# Patient Record
Sex: FEMALE | Race: ASIAN | NOT HISPANIC OR LATINO
[De-identification: names, ages, dates, MRNs, and addresses within clinical notes are randomized per-mention and may not be internally consistent; named-entity substitution may affect disease eponyms.]

---

## 2018-12-18 ENCOUNTER — APPOINTMENT (OUTPATIENT)
Dept: OPHTHALMOLOGY | Facility: CLINIC | Age: 63
End: 2018-12-18
Payer: MEDICARE

## 2018-12-18 DIAGNOSIS — H11.153 PINGUECULA, BILATERAL: ICD-10-CM

## 2018-12-18 DIAGNOSIS — H25.812 COMBINED FORMS OF AGE-RELATED CATARACT, LEFT EYE: ICD-10-CM

## 2018-12-18 DIAGNOSIS — Z85.840 PERSONAL HISTORY OF MALIGNANT NEOPLASM OF EYE: ICD-10-CM

## 2018-12-18 PROCEDURE — 92004 COMPRE OPH EXAM NEW PT 1/>: CPT

## 2018-12-18 PROCEDURE — 92136 OPHTHALMIC BIOMETRY: CPT

## 2018-12-18 PROCEDURE — 92499 UNLISTED OPH SVC/PROCEDURE: CPT

## 2019-01-10 ENCOUNTER — MEDICATION RENEWAL (OUTPATIENT)
Age: 64
End: 2019-01-10

## 2019-01-10 RX ORDER — PREDNISOLONE ACETATE 10 MG/ML
1 SUSPENSION/ DROPS OPHTHALMIC 4 TIMES DAILY
Qty: 1 | Refills: 1 | Status: ACTIVE | COMMUNITY
Start: 2019-01-10 | End: 1900-01-01

## 2019-01-10 RX ORDER — NEPAFENAC 3 MG/ML
0.3 SUSPENSION/ DROPS OPHTHALMIC DAILY
Qty: 1 | Refills: 1 | Status: ACTIVE | COMMUNITY
Start: 2019-01-10 | End: 1900-01-01

## 2019-01-10 RX ORDER — MOXIFLOXACIN OPHTHALMIC 5 MG/ML
0.5 SOLUTION/ DROPS OPHTHALMIC 4 TIMES DAILY
Qty: 1 | Refills: 3 | Status: ACTIVE | COMMUNITY
Start: 2019-01-10 | End: 1900-01-01

## 2019-01-14 ENCOUNTER — APPOINTMENT (OUTPATIENT)
Dept: OPHTHALMOLOGY | Facility: AMBULATORY SURGERY CENTER | Age: 64
End: 2019-01-14
Payer: MEDICARE

## 2019-01-14 ENCOUNTER — OUTPATIENT (OUTPATIENT)
Dept: OUTPATIENT SERVICES | Facility: HOSPITAL | Age: 64
LOS: 1 days | Discharge: HOME | End: 2019-01-14

## 2019-01-14 PROCEDURE — 66984 XCAPSL CTRC RMVL W/O ECP: CPT | Mod: RT

## 2019-01-15 ENCOUNTER — APPOINTMENT (OUTPATIENT)
Dept: OPHTHALMOLOGY | Facility: CLINIC | Age: 64
End: 2019-01-15
Payer: MEDICARE

## 2019-01-15 PROCEDURE — 99024 POSTOP FOLLOW-UP VISIT: CPT

## 2019-01-22 ENCOUNTER — APPOINTMENT (OUTPATIENT)
Dept: OPHTHALMOLOGY | Facility: CLINIC | Age: 64
End: 2019-01-22
Payer: MEDICARE

## 2019-01-22 PROCEDURE — 99024 POSTOP FOLLOW-UP VISIT: CPT

## 2019-02-05 ENCOUNTER — APPOINTMENT (OUTPATIENT)
Dept: OPHTHALMOLOGY | Facility: CLINIC | Age: 64
End: 2019-02-05
Payer: MEDICARE

## 2019-02-05 DIAGNOSIS — H25.811 COMBINED FORMS OF AGE-RELATED CATARACT, RIGHT EYE: ICD-10-CM

## 2019-02-05 PROCEDURE — 99024 POSTOP FOLLOW-UP VISIT: CPT

## 2019-08-01 ENCOUNTER — EMERGENCY (EMERGENCY)
Facility: HOSPITAL | Age: 64
LOS: 1 days | Discharge: HOME | End: 2019-08-01
Attending: EMERGENCY MEDICINE | Admitting: EMERGENCY MEDICINE
Payer: MEDICARE

## 2019-08-01 VITALS
SYSTOLIC BLOOD PRESSURE: 131 MMHG | HEART RATE: 65 BPM | TEMPERATURE: 98 F | OXYGEN SATURATION: 98 % | RESPIRATION RATE: 18 BRPM | HEIGHT: 62 IN | WEIGHT: 100.09 LBS | DIASTOLIC BLOOD PRESSURE: 73 MMHG

## 2019-08-01 VITALS
TEMPERATURE: 98 F | RESPIRATION RATE: 16 BRPM | SYSTOLIC BLOOD PRESSURE: 130 MMHG | OXYGEN SATURATION: 99 % | DIASTOLIC BLOOD PRESSURE: 69 MMHG | HEART RATE: 61 BPM

## 2019-08-01 LAB
ALBUMIN SERPL ELPH-MCNC: 4.1 G/DL — SIGNIFICANT CHANGE UP (ref 3.3–5)
ALP SERPL-CCNC: 59 U/L — SIGNIFICANT CHANGE UP (ref 40–120)
ALT FLD-CCNC: 21 U/L — SIGNIFICANT CHANGE UP (ref 10–45)
ANION GAP SERPL CALC-SCNC: 10 MMOL/L — SIGNIFICANT CHANGE UP (ref 5–17)
AST SERPL-CCNC: 27 U/L — SIGNIFICANT CHANGE UP (ref 10–40)
BASOPHILS # BLD AUTO: 0.02 K/UL — SIGNIFICANT CHANGE UP (ref 0–0.2)
BASOPHILS NFR BLD AUTO: 0.5 % — SIGNIFICANT CHANGE UP (ref 0–2)
BILIRUB SERPL-MCNC: 0.4 MG/DL — SIGNIFICANT CHANGE UP (ref 0.2–1.2)
BUN SERPL-MCNC: 13 MG/DL — SIGNIFICANT CHANGE UP (ref 7–23)
CALCIUM SERPL-MCNC: 8.9 MG/DL — SIGNIFICANT CHANGE UP (ref 8.4–10.5)
CHLORIDE SERPL-SCNC: 106 MMOL/L — SIGNIFICANT CHANGE UP (ref 96–108)
CO2 SERPL-SCNC: 25 MMOL/L — SIGNIFICANT CHANGE UP (ref 22–31)
CREAT SERPL-MCNC: 0.52 MG/DL — SIGNIFICANT CHANGE UP (ref 0.5–1.3)
EOSINOPHIL # BLD AUTO: 0.09 K/UL — SIGNIFICANT CHANGE UP (ref 0–0.5)
EOSINOPHIL NFR BLD AUTO: 2.4 % — SIGNIFICANT CHANGE UP (ref 0–6)
EXTRA BLUE TOP TUBE: SIGNIFICANT CHANGE UP
EXTRA SST TUBE: SIGNIFICANT CHANGE UP
GLUCOSE SERPL-MCNC: 163 MG/DL — HIGH (ref 70–99)
HCT VFR BLD CALC: 40.9 % — SIGNIFICANT CHANGE UP (ref 34.5–45)
HGB BLD-MCNC: 13.2 G/DL — SIGNIFICANT CHANGE UP (ref 11.5–15.5)
IMM GRANULOCYTES NFR BLD AUTO: 1.3 % — SIGNIFICANT CHANGE UP (ref 0–1.5)
LYMPHOCYTES # BLD AUTO: 1.19 K/UL — SIGNIFICANT CHANGE UP (ref 1–3.3)
LYMPHOCYTES # BLD AUTO: 32.1 % — SIGNIFICANT CHANGE UP (ref 13–44)
MCHC RBC-ENTMCNC: 32.3 GM/DL — SIGNIFICANT CHANGE UP (ref 32–36)
MCHC RBC-ENTMCNC: 32.8 PG — SIGNIFICANT CHANGE UP (ref 27–34)
MCV RBC AUTO: 101.5 FL — HIGH (ref 80–100)
MONOCYTES # BLD AUTO: 0.28 K/UL — SIGNIFICANT CHANGE UP (ref 0–0.9)
MONOCYTES NFR BLD AUTO: 7.5 % — SIGNIFICANT CHANGE UP (ref 2–14)
NEUTROPHILS # BLD AUTO: 2.08 K/UL — SIGNIFICANT CHANGE UP (ref 1.8–7.4)
NEUTROPHILS NFR BLD AUTO: 56.2 % — SIGNIFICANT CHANGE UP (ref 43–77)
NRBC # BLD: 0 /100 WBCS — SIGNIFICANT CHANGE UP (ref 0–0)
PLATELET # BLD AUTO: 176 K/UL — SIGNIFICANT CHANGE UP (ref 150–400)
POTASSIUM SERPL-MCNC: 4 MMOL/L — SIGNIFICANT CHANGE UP (ref 3.5–5.3)
POTASSIUM SERPL-SCNC: 4 MMOL/L — SIGNIFICANT CHANGE UP (ref 3.5–5.3)
PROT SERPL-MCNC: 6.3 G/DL — SIGNIFICANT CHANGE UP (ref 6–8.3)
RBC # BLD: 4.03 M/UL — SIGNIFICANT CHANGE UP (ref 3.8–5.2)
RBC # FLD: 13 % — SIGNIFICANT CHANGE UP (ref 10.3–14.5)
SODIUM SERPL-SCNC: 141 MMOL/L — SIGNIFICANT CHANGE UP (ref 135–145)
TROPONIN T SERPL-MCNC: <0.01 NG/ML — SIGNIFICANT CHANGE UP (ref 0–0.01)
WBC # BLD: 3.71 K/UL — LOW (ref 3.8–10.5)
WBC # FLD AUTO: 3.71 K/UL — LOW (ref 3.8–10.5)

## 2019-08-01 PROCEDURE — 70498 CT ANGIOGRAPHY NECK: CPT

## 2019-08-01 PROCEDURE — 82962 GLUCOSE BLOOD TEST: CPT

## 2019-08-01 PROCEDURE — 70450 CT HEAD/BRAIN W/O DYE: CPT | Mod: 26,59

## 2019-08-01 PROCEDURE — 70496 CT ANGIOGRAPHY HEAD: CPT

## 2019-08-01 PROCEDURE — 93010 ELECTROCARDIOGRAM REPORT: CPT

## 2019-08-01 PROCEDURE — 93005 ELECTROCARDIOGRAM TRACING: CPT

## 2019-08-01 PROCEDURE — 70498 CT ANGIOGRAPHY NECK: CPT | Mod: 26

## 2019-08-01 PROCEDURE — 0042T: CPT

## 2019-08-01 PROCEDURE — 70450 CT HEAD/BRAIN W/O DYE: CPT

## 2019-08-01 PROCEDURE — 71045 X-RAY EXAM CHEST 1 VIEW: CPT | Mod: 26

## 2019-08-01 PROCEDURE — 80053 COMPREHEN METABOLIC PANEL: CPT

## 2019-08-01 PROCEDURE — 99285 EMERGENCY DEPT VISIT HI MDM: CPT

## 2019-08-01 PROCEDURE — 84484 ASSAY OF TROPONIN QUANT: CPT

## 2019-08-01 PROCEDURE — 99285 EMERGENCY DEPT VISIT HI MDM: CPT | Mod: 25

## 2019-08-01 PROCEDURE — 85025 COMPLETE CBC W/AUTO DIFF WBC: CPT

## 2019-08-01 PROCEDURE — 99283 EMERGENCY DEPT VISIT LOW MDM: CPT

## 2019-08-01 PROCEDURE — 71045 X-RAY EXAM CHEST 1 VIEW: CPT

## 2019-08-01 PROCEDURE — 70496 CT ANGIOGRAPHY HEAD: CPT | Mod: 26

## 2019-08-01 RX ORDER — MECLIZINE HCL 12.5 MG
25 TABLET ORAL ONCE
Refills: 0 | Status: DISCONTINUED | OUTPATIENT
Start: 2019-08-01 | End: 2019-08-01

## 2019-08-01 RX ORDER — SODIUM CHLORIDE 9 MG/ML
1000 INJECTION INTRAMUSCULAR; INTRAVENOUS; SUBCUTANEOUS ONCE
Refills: 0 | Status: COMPLETED | OUTPATIENT
Start: 2019-08-01 | End: 2019-08-01

## 2019-08-01 RX ADMIN — SODIUM CHLORIDE 1000 MILLILITER(S): 9 INJECTION INTRAMUSCULAR; INTRAVENOUS; SUBCUTANEOUS at 08:48

## 2019-08-01 NOTE — ED ADULT NURSE NOTE - FINAL NURSING ELECTRONIC SIGNATURE
I have ordered CT abd with and without contrast with liver protocol on this patient. Please arrange.     Also, please call PCP and determine if patient called their office regarding dizziness in the office today. She will need to be see as soon as possible.    Thanks.   01-Aug-2019 10:40

## 2019-08-01 NOTE — ED PROVIDER NOTE - OBJECTIVE STATEMENT
63 yo with ho of vertigo in past, meningioma , feeling vague vertigo sensation mostly at night for last 2 days, awoke this morning at 6 am feeling well however around 630 again felt vertiginous, felt gait instability and new sensation of numbness and weakness on right side of body and right face. no falls, no headache, no neck pain. no visual changes.

## 2019-08-01 NOTE — ED PROVIDER NOTE - PROGRESS NOTE DETAILS
CT reassuring, plan to admit for workup for TIA vs possible seizure, plans for EEG and MRI in pt . pt now neuro intact.

## 2019-08-01 NOTE — ED ADULT NURSE NOTE - NSIMPLEMENTINTERV_GEN_ALL_ED
Implemented All Fall Risk Interventions:  [place] to call system. Call bell, personal items and telephone within reach. Instruct patient to call for assistance. Room bathroom lighting operational. Non-slip footwear when patient is off stretcher. Physically safe environment: no spills, clutter or unnecessary equipment. Stretcher in lowest position, wheels locked, appropriate side rails in place. Provide visual cue, wrist band, yellow gown, etc. Monitor gait and stability. Monitor for mental status changes and reorient to person, place, and time. Review medications for side effects contributing to fall risk. Reinforce activity limits and safety measures with patient and family.

## 2019-08-01 NOTE — ED PROVIDER NOTE - CLINICAL SUMMARY MEDICAL DECISION MAKING FREE TEXT BOX
65 yo with ho meningioma, vertigo , now with subjective ataxia and numbness athough zero stroke scale. stroke code called for evaluation for CVA, stroke team evaluating pt .

## 2019-08-01 NOTE — ED ADULT TRIAGE NOTE - CHIEF COMPLAINT QUOTE
pt. c/o headache, unbalanced and dizziness since Tuesday night, reports hx of vertigo, pt. woke up today at 6am with weakness to Rt side of body and headache.

## 2019-08-01 NOTE — ED PROVIDER NOTE - CARE PLAN
Principal Discharge DX:	Transient ischemia Principal Discharge DX:	Vertigo  Secondary Diagnosis:	Transient ischemia

## 2019-08-01 NOTE — ED PROVIDER NOTE - NEUROLOGICAL, MLM
Alert and oriented, no focal deficits, no motor or sensory deficits. CN'II-XII intact, no pronator drift, nl finger to nose, clear speech, gait intact  ( pt reports feeling difference on rt side but equal to light touch and pt also reports feeling gait instability however appears normal )

## 2019-08-01 NOTE — ED ADULT NURSE NOTE - OBJECTIVE STATEMENT
Received a 64 female with a chief complaint of weakness isolated to the right upper extremity onset 0600 upon awakening accompanied with numbness that has been resolved. Patient with a PMH of Vertigo, Chronic Right Orbital Meningioma. Patient reports that she had an episode of dizziness and weakness on Tuesday which she attested to her vertigo. Patient is able to stand and ambulate independently. Patient in no acute distress. No facial droop, no slurring of speech or aphasia. GCS 15. Patient awake, alert, and oriented. Verbally responsive. Sensation present to all extremities. Patient reports that she has undergone a series of brain MRIs on a yearly basis to evaluate for the meningioma. Last reported radiograph reported to be MAR 2019.

## 2019-08-01 NOTE — CONSULT NOTE ADULT - ASSESSMENT
64y Female with PMHx of vertigo and meningioma (s/p orbital craniotomy resection in 98) presents with 2 hours of right sided facial, upper and lower extremity numbness and tingling. CTH/CTA/CTN with no signs of infarct, hemorrage, or large vessel occlusion. No changes in meningioma. Neuro exam benign with no sensory changes.          Dispo: Home  Discussed with Dr. Ari Wagoner, Neuro consults 64y Female with PMHx of vertigo and meningioma (s/p orbital craniotomy resection in 98) presents with 2 hours of right sided facial, upper and lower extremity numbness and tingling. CTH/CTA/CTN with no signs of infarct, hemorrhage or large vessel occlusion. No changes in meningioma. Neuro exam benign with no sensory changes.    Recommendations:    - Patient refusing admission for further workup of symptoms at this time. Ok to be discharged from Neuro standpoint as long as patient aware that she needs to follow up with PCP which would include TTE with bubble, MRI and holter monitor  - start ASA 81mg daily       Dispo: Home  Discussed with Dr. Ari Wagoner, Neuro consults

## 2019-08-01 NOTE — ED PROVIDER NOTE - CARE PROVIDER_API CALL
Phillip Chapman)  Neurology  [address], [place] [facility]  Phone: [phone]  Fax: [phone]  Follow Up Time:

## 2019-08-01 NOTE — CONSULT NOTE ADULT - SUBJECTIVE AND OBJECTIVE BOX
**STROKE CODE CONSULT NOTE**    Last known well time/Time of onset of symptoms:    HPI: 64y Female with PMHx of vertigo and meningioma (s/p orbital craniotomy resection in 98) presents with 2 hours of right sided facial, upper and lower extremity numbness and tingling. For the past two days, she was feeling dizzy and attributed it to her known vertigo as it was similar in nature. This morning she awoke with the sensory changes which was new to her so she took 1 baby aspirin and came to the ED because it persisted. Of note, she describes similar symptoms in 05 which is when she was diagnosed with vertigo. Patient is uptodate on physical exam screening and recently followed up with her opthalmologist for her meningioma. She last got an MRI in January of 2018 which was stable. Stroke code was called in the ED and CT head was normal as well as CTA and CTN. She currently denies any symptoms including h/n/v/d, numbness/tingling, vision changes, cp, sob, palpitations, gait disturbances, abd pain and leg pain.     T(C): 36.8 (08-01-19 @ 10:38), Max: 36.8 (08-01-19 @ 08:00)  HR: 61 (08-01-19 @ 10:38) (59 - 66)  BP: 130/69 (08-01-19 @ 10:38) (122/68 - 131/73)  RR: 16 (08-01-19 @ 10:38) (16 - 20)  SpO2: 99% (08-01-19 @ 10:38) (98% - 99%)    PAST MEDICAL & SURGICAL HISTORY:  Vertigo  Meningioma    FAMILY HISTORY:  SOCIAL HISTORY:  Denies smoking, drinking, or drug use    ROS:   Constitutional: No fever, weight loss or fatigue  Eyes: No eye pain, visual disturbances, or discharge  ENMT:  No difficulty hearing, tinnitus, vertigo; No sinus or throat pain  Neck: No pain or stiffness  Respiratory: No cough, wheezing, chills or hemoptysis  Cardiovascular: No chest pain, palpitations, shortness of breath, dizziness or leg swelling  Gastrointestinal: No abdominal pain. No nausea, vomiting or hematemesis; No diarrhea or constipation. Nohematochezia.  Genitourinary: No dysuria, frequency, hematuria or incontinence  Neurological: As per HPI  Skin: No itching, burning, rashes or lesions   Endocrine: No heat or cold intolerance; No hair loss  Musculoskeletal: No joint pain or swelling; No muscle, back or extremity pain  Psychiatric: No depression, anxiety, mood swings or difficulty sleeping  Heme/Lymph: No easy bruising or bleeding gums    MEDICATIONS  (STANDING):    MEDICATIONS  (PRN):    Allergies    No Known Allergies    Intolerances    Vital Signs Last 24 Hrs  T(C): 36.8 (01 Aug 2019 10:38), Max: 36.8 (01 Aug 2019 08:00)  T(F): 98.3 (01 Aug 2019 10:38), Max: 98.3 (01 Aug 2019 10:38)  HR: 61 (01 Aug 2019 10:38) (59 - 66)  BP: 130/69 (01 Aug 2019 10:38) (122/68 - 131/73)  BP(mean): --  RR: 16 (01 Aug 2019 10:38) (16 - 20)  SpO2: 99% (01 Aug 2019 10:38) (98% - 99%)    Physical exam:  General: No acute distress, awake and alert  Cardiovascular: Regular rate and rhythm, no murmurs, rubs, or gallops. No bruits  Pulmonary: Anterior breath sounds clear bilaterally, no crackles or wheezing. No use of accessory muscles  Extremities: Radial and DP pulses +2, no edema    Neurologic:  -Mental status: Awake, alert, oriented to person, place, and time. Speech is fluent with intact naming, repetition, and comprehension, no dysarthria. Recent and remote memory intact. Follows commands. Attention/concentration intact. Fund of knowledge appropriate.  -Cranial nerves:   II: Visual fields are full to confrontation.  III, IV, VI: Extraocular movements are intact without nystagmus. Pupils equally round and reactive to light  V:  Facial sensation V1-V3 equal and intact   VII: Face is symmetric with normal eye closure and smile  VIII: Hearing is bilaterally intact to finger rub  IX, X: Uvula is midline and soft palate rises symmetrically  XI: Head turning and shoulder shrug are intact.  XII: Tongue protrudes midline  Motor: Normal bulk and tone. No pronator drift. Strength bilateral upper extremity 5/5, bilateral lower extremities 5/5.  Rapid alternating movements intact and symmetric  Sensation: Intact to light touch bilaterally. No neglect or extinction on double simultaneous testing.  Coordination: No dysmetria on finger-to-nose and heel-to-shin bilaterally  Reflexes: Downgoing toes bilaterally   Gait: Narrow gait and steady    NIHSS: 0    Fingerstick Blood Glucose: CAPILLARY BLOOD GLUCOSE  123 (01 Aug 2019 10:58)    POCT Blood Glucose.: 123 mg/dL (01 Aug 2019 08:00)    LABS:                        13.2   3.71  )-----------( 176      ( 01 Aug 2019 07:35 )             40.9     08-01    141  |  106  |  13  ----------------------------<  163<H>  4.0   |  25  |  0.52    Ca    8.9      01 Aug 2019 07:35    TPro  6.3  /  Alb  4.1  /  TBili  0.4  /  DBili  x   /  AST  27  /  ALT  21  /  AlkPhos  59  08-01      CARDIAC MARKERS ( 01 Aug 2019 07:35 )  x     / <0.01 ng/mL / x     / x     / x              RADIOLOGY & ADDITIONAL STUDIES:    HCT: normal     CTA: normal     CTP: normal     -----------------------------------------------------------------------------------------------------------------  IV-tPA (Y/N):  N                             [place] time:  Reason IV-tPA not given: Resolution of symptoms

## 2019-08-01 NOTE — ED PROVIDER NOTE - NSFOLLOWUPINSTRUCTIONS_ED_ALL_ED_FT
Take baby aspirin once daily  follow up with cardiology and neurology  You should talk to them about MR, EEG and Holter monitoring for further workup.       Transient Ischemic Attack    WHAT YOU NEED TO KNOW:    What is a transient ischemic attack (TIA)? A TIA, or mini-stroke, happens when blood cannot flow to part of the brain. A TIA only lasts minutes to hours and does not cause lasting damage. It is still important to get immediate medical care. A TIA may be a warning that you are about to have an ischemic stroke. An ischemic stroke happens when blood flow to the brain is suddenly blocked, usually by a blood clot.Ischemic Stroke         What are the warning signs of a stroke? The word F.A.S.T. can help you remember and recognize warning signs of a stroke.     F = Face: One side of the face droops.      A = Arms: One arm starts to drop when both arms are raised.      S = Speech: Speech is slurred or sounds different than usual.      T = Time: A person who is having a stroke needs to be seen immediately. A stroke is a medical emergency that needs immediate treatment. Some medicines and treatments work best if given within a few hours of a stroke.FAST Sings of a Stroke         What are the signs and symptoms of a TIA? Any of the following may happen suddenly and be gone quickly:     Numb or weak areas of your face, arm, or leg, or paralysis on one side of your body      Trouble walking or keeping your balance      Dizziness or a severe headache      Slurred speech, trouble talking, or not understanding language      Blurry or double vision, or sudden blindness in one or both eyes    What increases my risk for a TIA?     Being male or 55 years of age or older      Use of birth control pills or hormone replacement medicine (in women)      A family history of stroke, or a low birthweight      High blood pressure, blood vessel disease, or sickle cell anemia that is not being treated      Atrial fibrillation, diabetes, or another heart condition    How is a TIA diagnosed? Your healthcare provider will ask you about the TIA. He or she may want to talk to anyone who witnessed the TIA or found you after it. You or the person should describe your signs and symptoms and when they started. Tell your provider about any medical conditions you have. You may also need any of the following:    Blood tests may be used to check your overall condition and how well your blood clots. The tests may also check for a high or low glucose (sugar) level. This can sometimes cause effects that are like a stroke or TIA.      A carotid ultrasound shows the blood flow in your carotid arteries. The carotid arteries are blood vessels in your neck that carry blood to your brain. A carotid ultrasound checks for narrow or blocked carotid arteries.      CT or MRI pictures may show blood flow blockage in your brain. You may be given contrast liquid to help the pictures show up better. Tell the healthcare provider if you have ever had an allergic reaction to contrast liquid. Do not enter the MRI room with anything metal. Metal can cause serious injury. Tell the healthcare provider if you have any metal in or on your body.    How is a TIA treated? A TIA does not need to be treated. The following may be used to treat the cause of your TIA to prevent a stroke:     Medicines may be used to prevent blood clots from forming. Other medicines may be needed to treat diabetes, depression, high cholesterol, or blood pressure problems. You may also need medicine to decrease the pressure in your brain, reduce pain, or prevent seizures.      Surgery may be needed to open a blocked artery (blood vessel). Blocked carotid arteries cause poor blood flow to the brain or heart.    What can I do to prevent another TIA or a stroke?     Manage health conditions. A condition such as diabetes can increase your risk for a stroke. Control your blood sugar level if you have hyperglycemia or diabetes. Take your prescribed medicines and check your blood sugar level as directed.How to check your blood sugar           Check your blood pressure as directed. High blood pressure can increase your risk for a stroke. If you have high blood pressure, follow your healthcare provider’s directions. How to take a Blood Pressure           Do not use nicotine products or illegal drugs. Nicotine and other chemicals in cigarettes and cigars can cause blood vessel damage. Nicotine and illegal drugs both increase your risk for a stroke. Ask your healthcare provider for information if you currently smoke or use drugs and need help to quit. E- cigarettes or smokeless tobacco still contain nicotine. Talk to your healthcare provider before you use these products.      Talk to your healthcare provider about alcohol. Alcohol can raise your blood pressure. The recommended limit is 2 drinks in a day for men and 1 drink in a day for women. Do not binge drink or save a week's worth of alcohol to drink in 1 or 2 days. Limit weekly amounts as directed by your provider.      Eat a variety of healthy foods. Healthy foods include whole-grain breads, low-fat dairy products, beans, lean meats, and fish. Eat at least 5 servings of fruits and vegetables each day. Choose foods that are low in fat, cholesterol, salt, and sugar. Eat foods that are high in potassium, such as potatoes and bananas. A dietitian can help you create healthy meal plans.      Maintain a healthy weight. Ask your healthcare provider how much you should weigh. Ask him or her to help you create a weight loss plan if you are overweight. He or she can help you create small goals if you have a lot of weight to lose.      Exercise as directed. Exercise can lower your blood pressure, cholesterol, weight, and blood sugar levels. Healthcare providers will help you create exercise goals. They can also help you make a plan to reach your goals. For example, you can break exercise into 10 minute periods, 3 times in the day. Find an exercise that you enjoy. This will make it easier for you to reach your exercise goals.Walking for Exercise           Manage stress. Stress can raise your blood pressure. Find ways to relax, such as deep breathing or listening to music.    Call your local emergency number (911 in the [place]) or have someone else call if:     You have any of the following signs of a stroke:   Numbness or drooping on one side of your face       Weakness in an arm or leg      Confusion or difficulty speaking      Dizziness, a severe headache, or vision loss      FAST Sings of a Stroke         When should I call my doctor?     You have double vision or vision loss.      You have a severe headache or feel dizzy.      Your blood pressure or blood sugar level is higher or lower than you were told it should be.      You have questions or concerns about your condition or care.    CARE AGREEMENT:    You have the right to help plan your care. Learn about your health condition and how it may be treated. Discuss treatment options with your healthcare providers to decide what care you want to receive. You always have the right to refuse treatment.

## 2019-08-02 ENCOUNTER — INBOUND DOCUMENT (OUTPATIENT)
Age: 64
End: 2019-08-02

## 2019-08-06 ENCOUNTER — APPOINTMENT (OUTPATIENT)
Dept: HEART AND VASCULAR | Facility: CLINIC | Age: 64
End: 2019-08-06
Payer: MEDICARE

## 2019-08-06 VITALS
WEIGHT: 99.12 LBS | SYSTOLIC BLOOD PRESSURE: 110 MMHG | HEIGHT: 62 IN | DIASTOLIC BLOOD PRESSURE: 70 MMHG | BODY MASS INDEX: 18.24 KG/M2 | TEMPERATURE: 98.1 F | HEART RATE: 63 BPM | OXYGEN SATURATION: 99 %

## 2019-08-06 PROCEDURE — 93000 ELECTROCARDIOGRAM COMPLETE: CPT

## 2019-08-06 PROCEDURE — 99213 OFFICE O/P EST LOW 20 MIN: CPT

## 2019-08-06 NOTE — REASON FOR VISIT
[Follow-Up - Clinic] : a clinic follow-up of [Dizziness] : dizziness [FreeTextEntry1] : She had acute onset of vertigo with some right arm tingling - see at [facility] ED where eval, including EKG, labs and head CT were neg.  Sx were brief - have not recurred, although she gets vertigo rarely.  Has a known meningioma of optic nerve.\par \par EKG normal here today.\par \par She had a normal stress echo here in 2017

## 2019-08-08 DIAGNOSIS — I99.8 OTHER DISORDER OF CIRCULATORY SYSTEM: ICD-10-CM

## 2019-08-08 DIAGNOSIS — R53.1 WEAKNESS: ICD-10-CM

## 2021-10-08 PROBLEM — R42 DIZZINESS AND GIDDINESS: Chronic | Status: ACTIVE | Noted: 2019-08-01

## 2021-10-08 PROBLEM — D32.9 BENIGN NEOPLASM OF MENINGES, UNSPECIFIED: Chronic | Status: ACTIVE | Noted: 2019-08-01

## 2021-11-02 ENCOUNTER — APPOINTMENT (OUTPATIENT)
Dept: OPHTHALMOLOGY | Facility: CLINIC | Age: 66
End: 2021-11-02
Payer: MEDICARE

## 2021-11-02 ENCOUNTER — NON-APPOINTMENT (OUTPATIENT)
Age: 66
End: 2021-11-02

## 2021-11-02 PROCEDURE — 92134 CPTRZ OPH DX IMG PST SGM RTA: CPT

## 2021-11-02 PROCEDURE — 92136 OPHTHALMIC BIOMETRY: CPT

## 2021-11-02 PROCEDURE — 92014 COMPRE OPH EXAM EST PT 1/>: CPT

## 2022-12-07 NOTE — ED ADULT TRIAGE NOTE - AS HEIGHT TYPE
Janna Díaz was seen and treated in our emergency department on 12/7/2022. She may return to work on 12/08/2022. If you have any questions or concerns, please don't hesitate to call.       Harjit Anaya, APRN - CNP
stated

## 2023-01-03 ENCOUNTER — APPOINTMENT (OUTPATIENT)
Dept: OTOLARYNGOLOGY | Facility: CLINIC | Age: 68
End: 2023-01-03
Payer: MEDICARE

## 2023-01-03 VITALS
RESPIRATION RATE: 18 BRPM | TEMPERATURE: 97.1 F | OXYGEN SATURATION: 96 % | HEIGHT: 62 IN | DIASTOLIC BLOOD PRESSURE: 78 MMHG | BODY MASS INDEX: 18.03 KG/M2 | SYSTOLIC BLOOD PRESSURE: 127 MMHG | HEART RATE: 83 BPM | WEIGHT: 98 LBS

## 2023-01-03 DIAGNOSIS — Z82.2 FAMILY HISTORY OF DEAFNESS AND HEARING LOSS: ICD-10-CM

## 2023-01-03 DIAGNOSIS — Z86.018 PERSONAL HISTORY OF OTHER BENIGN NEOPLASM: ICD-10-CM

## 2023-01-03 DIAGNOSIS — H61.22 IMPACTED CERUMEN, LEFT EAR: ICD-10-CM

## 2023-01-03 DIAGNOSIS — Z78.9 OTHER SPECIFIED HEALTH STATUS: ICD-10-CM

## 2023-01-03 DIAGNOSIS — Z87.891 PERSONAL HISTORY OF NICOTINE DEPENDENCE: ICD-10-CM

## 2023-01-03 DIAGNOSIS — H91.92 UNSPECIFIED HEARING LOSS, LEFT EAR: ICD-10-CM

## 2023-01-03 PROCEDURE — 69801 INCISE INNER EAR: CPT | Mod: LT

## 2023-01-03 PROCEDURE — 92557 COMPREHENSIVE HEARING TEST: CPT

## 2023-01-03 PROCEDURE — 99214 OFFICE O/P EST MOD 30 MIN: CPT | Mod: 25

## 2023-01-03 PROCEDURE — 92550 TYMPANOMETRY & REFLEX THRESH: CPT | Mod: 52

## 2023-01-03 RX ORDER — PREDNISONE 50 MG/1
TABLET ORAL
Refills: 0 | Status: ACTIVE | COMMUNITY

## 2023-01-03 RX ORDER — PREDNISONE 5 MG/1
5 TABLET ORAL
Qty: 28 | Refills: 0 | Status: ACTIVE | COMMUNITY
Start: 2023-01-03 | End: 1900-01-01

## 2023-01-03 RX ORDER — OFLOXACIN OTIC 3 MG/ML
0.3 SOLUTION AURICULAR (OTIC)
Qty: 1 | Refills: 1 | Status: ACTIVE | COMMUNITY
Start: 2023-01-03 | End: 1900-01-01

## 2023-01-03 NOTE — REASON FOR VISIT
[Initial Evaluation] : an initial evaluation for [FreeTextEntry2] : tinnitus, hearing loss, vertigo, aural fullness

## 2023-01-03 NOTE — ASSESSMENT
[FreeTextEntry1] : l sudden hearing loss\par -l minimal cerumen removed\par -outside  on  showed r nl hearing, l hf snhl\par - showed r nl hearing, l hf snhl slight improvement when compared with 22 -results reviewed with pt \par -discussed risks and benefits and alts to po and IT dexamethasone injxn\par -finish course of prednisone- she is on day  and is on 40 mg she had no taper - I ordered her one and explained she should begin it tmw and to stop her 40 mg dose after today. recommended she take her antacid meds she said she has but has not taken. I explained we typically prescribe 60 mg prednisone but she said she has too many side effects from higher doses and does nto wish to take it. \par -pt wished to proceed\par -done\par -dep\par -ofloxacin 2 days\par -discussed hbo - she prefers to hold off\par -MRI\par RTC in 1 week with  and MRI to review findings \par

## 2023-01-03 NOTE — HISTORY OF PRESENT ILLNESS
[de-identified] : 68 y/o F presenting with loud tinnitus - she could not tell if it was high or low pitched - which began on 12/21/22 in her left ear and subsequent hearing loss. She saw Dr. Guillermo on 12/22/22 and had a hearing test and was told she had loss (not in a sound [place]). She was started on 40 mg prednisone on 12/23/2 and is on day 12 of 14. She had a followup hearing test in [place] and was told she had slight improvement. She feels hearing has improved. She is in process of setting up MRI. She is c/o l aural fullness which improved with sudafed. At times certain sounds seem amplified. Prior to this event, she denies any issues with her hearing. She is also c/o intermittent episodes of vertigo when she lies down and gets up quickly for the past 2 weeks after starting prednisone. She states episodes last for about a minute. She has had episodes of dizziness in the past. She has h/o TMJ and wears a mouth guard. Former smoker, quit in 1977. No FH or SH pertinent to cc.

## 2023-01-03 NOTE — PHYSICAL EXAM
[Normal] : no nystagmus [de-identified] : r nl, l scant cerumen removed atraumatically under microscope with suction [de-identified] : lesion on l tip of nose- pt had basal cell carcinoma recently removed [de-identified] : gait steady

## 2023-01-03 NOTE — PROCEDURE
[Risk and Benefits Discussed] : The purpose, risks, discomforts, benefits and alternatives of the procedure have been explained to the patient including no treatment. [[facility] [Sudden [facility]] : Sudden Hearing Loss [Same] : same as the Pre Op Dx. [] : Intratympanic Therapy [FreeTextEntry4] : phenol  [FreeTextEntry5] : 0.3 cc Dexamethasone injected AS IT atraumatically under microscope  [FreeTextEntry6] : l scant cerumen removed atraumatically under microscope

## 2023-01-03 NOTE — DATA REVIEWED
[de-identified] : outside  on  showed r nl hearing, l hf snhl\par  showed r nl hearing, l hf snhl, minimal improvement when compared with 22 -results reviewed with pt

## 2023-01-09 ENCOUNTER — APPOINTMENT (OUTPATIENT)
Dept: OTOLARYNGOLOGY | Facility: CLINIC | Age: 68
End: 2023-01-09
Payer: MEDICARE

## 2023-01-09 VITALS
DIASTOLIC BLOOD PRESSURE: 87 MMHG | OXYGEN SATURATION: 98 % | BODY MASS INDEX: 18.03 KG/M2 | HEART RATE: 70 BPM | WEIGHT: 98 LBS | TEMPERATURE: 97 F | RESPIRATION RATE: 16 BRPM | SYSTOLIC BLOOD PRESSURE: 147 MMHG | HEIGHT: 62 IN

## 2023-01-09 PROCEDURE — 92550 TYMPANOMETRY & REFLEX THRESH: CPT

## 2023-01-09 PROCEDURE — 99214 OFFICE O/P EST MOD 30 MIN: CPT | Mod: 25

## 2023-01-09 PROCEDURE — 92557 COMPREHENSIVE HEARING TEST: CPT

## 2023-01-09 PROCEDURE — 69801 INCISE INNER EAR: CPT | Mod: LT

## 2023-01-09 NOTE — HISTORY OF PRESENT ILLNESS
[de-identified] : 6 d followup for this 68 yo f with l sudden hl which began 12/21/22 - she has had po prednisone and 1 IT injection AS. She reports that her hearing seems to be unchanged. Denies pain or drainage. She had mri before this appt.

## 2023-01-09 NOTE — PROCEDURE
[Risk and Benefits Discussed] : The purpose, risks, discomforts, benefits and alternatives of the procedure have been explained to the patient including no treatment. [[facility] [[facility]den [facility]] : Sudden Hearing Loss [Same] : same as the Pre Op Dx. [] : Intratympanic Therapy [FreeTextEntry4] : phenol [FreeTextEntry6] : 0.3 cc dex injected AS IT ad under microscope atraumatically

## 2023-01-09 NOTE — ASSESSMENT
[FreeTextEntry1] : l sudden hl persistent\par s/o po prednisone and 1 IT l dex injection without improvement\par discussed risks benefits and alts to another IT dex injx\par she wished to proceed\par done\par dep \par drops 2d\par reassured mri no cpa tumor\par she prefers to hold off hbo\par rtc 4 d with \par  r orbital meningioma - she has a mgmt team for this

## 2023-01-09 NOTE — DATA REVIEWED
[de-identified] :  reviewed with pt - l asymm hf snhl no change [de-identified] : mri r orbital mass - she is aware and being treated - has mri scheduled for tmw of orbits;  were clear. disc bulge.

## 2023-01-13 ENCOUNTER — APPOINTMENT (OUTPATIENT)
Dept: OTOLARYNGOLOGY | Facility: CLINIC | Age: 68
End: 2023-01-13
Payer: MEDICARE

## 2023-01-13 VITALS
WEIGHT: 98 LBS | HEART RATE: 71 BPM | DIASTOLIC BLOOD PRESSURE: 87 MMHG | BODY MASS INDEX: 18.03 KG/M2 | HEIGHT: 62 IN | OXYGEN SATURATION: 99 % | SYSTOLIC BLOOD PRESSURE: 143 MMHG | RESPIRATION RATE: 16 BRPM | TEMPERATURE: 97.8 F

## 2023-01-13 PROCEDURE — 92557 COMPREHENSIVE HEARING TEST: CPT

## 2023-01-13 PROCEDURE — 99213 OFFICE O/P EST LOW 20 MIN: CPT | Mod: 25

## 2023-01-13 PROCEDURE — 69801 INCISE INNER EAR: CPT | Mod: LT

## 2023-01-14 NOTE — PHYSICAL EXAM
[de-identified] : l pinhole perforation  [Normal] : no nystagmus [de-identified] : gait steady

## 2023-01-14 NOTE — DATA REVIEWED
[de-identified] :  showed r nl hearing, l hfsnhl stable when compared with 23 -results reviewed with pt

## 2023-01-14 NOTE — PROCEDURE
[facility] [[facility]] : Sudden Hearing Loss [Same] : same as the Pre Op Dx. [] : Intratympanic Therapy [Risk and Benefits Discussed] : The purpose, risks, discomforts, benefits and alternatives of the procedure have been explained to the patient including no treatment. [FreeTextEntry5] : 0.3 cc Dexamethasone injected AS IT atraumatically under microscope thru pinhole perf

## 2023-01-14 NOTE — HISTORY OF PRESENT ILLNESS
[de-identified] : 4 day followup visit for this 68 y/o F with left sudden hearing loss on 12/21/22. She has finished a course of po prednisone and has had two IT dexamethasone injections. She is here to recheck her hearing. She feels that it has not changed.

## 2023-01-14 NOTE — ASSESSMENT
[FreeTextEntry1] : l sosnhl\par - showed r nl hearing, l snhl stable when compared with 23 -results reviewed with pt \par -s/p po prednisone\par -discussed risks and benefits and alts to another IT dexamethasone injxn- pt has had 2 thus far\par -pt wished to proceed; still prefers to hold off hbo\par -done\par -dep\par -ofloxacin 2 days\par RTC in 1 week with rpt  or sooner as needed

## 2023-01-18 ENCOUNTER — APPOINTMENT (OUTPATIENT)
Dept: OTOLARYNGOLOGY | Facility: CLINIC | Age: 68
End: 2023-01-18
Payer: MEDICARE

## 2023-01-18 VITALS
HEIGHT: 62 IN | TEMPERATURE: 97.7 F | BODY MASS INDEX: 17.66 KG/M2 | HEART RATE: 74 BPM | WEIGHT: 96 LBS | DIASTOLIC BLOOD PRESSURE: 84 MMHG | OXYGEN SATURATION: 99 % | SYSTOLIC BLOOD PRESSURE: 128 MMHG

## 2023-01-18 PROCEDURE — 69801 INCISE INNER EAR: CPT | Mod: LT

## 2023-01-18 PROCEDURE — 99213 OFFICE O/P EST LOW 20 MIN: CPT | Mod: 25

## 2023-01-18 PROCEDURE — 92557 COMPREHENSIVE HEARING TEST: CPT

## 2023-01-18 NOTE — HISTORY OF PRESENT ILLNESS
[de-identified] : 5 d followup appt for this 66 yo f who suffered l sudden hl; she has had po prednisone and 3 intratympanic dexamethasone injections - she reports subjective improvement.

## 2023-01-18 NOTE — ASSESSMENT
[FreeTextEntry1] : l sudden hl\par has had subjective improvement but  shows no change-\par  reviewed with pt but explained this is possible\par discussed risks benefits and alts to another IT dex injx but made it clear chances of success are low - she stated she wished to proceed  as this is c/w aao-hns guidelines I had reviewed with her.\par done\par dep\par drops 2d\par rtc next week with

## 2023-01-18 NOTE — PROCEDURE
[Risk and Benefits Discussed] : The purpose, risks, discomforts, benefits and alternatives of the procedure have been explained to the patient including no treatment. [facility] : [facility] [Sudden Hearing Loss] : Sudden Hearing Loss [Same] : same as the Pre Op Dx. [] : Intratympanic Therapy [FreeTextEntry4] : phenol [FreeTextEntry6] : 0.3 cc dex injected AS atraumatically under microscope

## 2023-01-26 ENCOUNTER — APPOINTMENT (OUTPATIENT)
Dept: OTOLARYNGOLOGY | Facility: CLINIC | Age: 68
End: 2023-01-26
Payer: MEDICARE

## 2023-01-26 ENCOUNTER — APPOINTMENT (OUTPATIENT)
Dept: PHARMACY | Facility: CLINIC | Age: 68
End: 2023-01-26
Payer: MEDICARE

## 2023-01-26 VITALS
DIASTOLIC BLOOD PRESSURE: 87 MMHG | TEMPERATURE: 98.1 F | SYSTOLIC BLOOD PRESSURE: 149 MMHG | RESPIRATION RATE: 14 BRPM | HEART RATE: 74 BPM | OXYGEN SATURATION: 100 %

## 2023-01-26 DIAGNOSIS — H91.22 SUDDEN IDIOPATHIC HEARING LOSS, LEFT EAR: ICD-10-CM

## 2023-01-26 DIAGNOSIS — H93.12 TINNITUS, LEFT EAR: ICD-10-CM

## 2023-01-26 DIAGNOSIS — R42 DIZZINESS AND GIDDINESS: ICD-10-CM

## 2023-01-26 PROCEDURE — 92557 COMPREHENSIVE HEARING TEST: CPT

## 2023-01-26 PROCEDURE — 99213 OFFICE O/P EST LOW 20 MIN: CPT

## 2023-01-26 NOTE — REASON FOR VISIT
[Subsequent Evaluation] : a subsequent evaluation for [FreeTextEntry2] : left sudden hl and tinnitus

## 2023-01-26 NOTE — HISTORY OF PRESENT ILLNESS
[de-identified] : 8 d follow up appt for this 68 yo f with l sudden hl and tinnitus. she has had po prednisone and 4 IT dexamethasone injxs. She repoerts that she feels improved.

## 2023-01-26 NOTE — PHYSICAL EXAM
[de-identified] : l tm scab [Normal] : assessment of respiratory effort is normal [de-identified] : gait steady

## 2023-01-26 NOTE — ASSESSMENT
[FreeTextEntry1] : l sudden  hl manifest with tinnitus. no improvement after 4 IT dexamethasone injxs and po prednisone - we agreed to leave it alone for now. Discussed shona hopkins; she prefers to wait for now\par rtc 4-6 weeks withy \par disequilibrium remains bothersome - v rehab ordered

## 2023-02-09 ENCOUNTER — TRANSCRIPTION ENCOUNTER (OUTPATIENT)
Age: 68
End: 2023-02-09

## 2023-03-01 NOTE — ED PROVIDER NOTE - PROVIDER TOKENS
Pharmacy Medication History  Admission medication history interview status for the 3/1/2023  admission is complete. See EPIC admission navigator for prior to admission medications     Location of Interview: Patient room  Medication history sources: Patient, Patient's family/friend (wife) and Surescripts    Additional medication history information:   --Patient currently reports not taking any medications.     Medication reconciliation completed by provider prior to medication history? No    Time spent in this activity: 5 min    Prior to Admission medications    Not on File       The information provided in this note is only as accurate as the sources available at the time of update(s)     
PROVIDER:[TOKEN:[[id number]]]

## 2023-03-20 ENCOUNTER — APPOINTMENT (OUTPATIENT)
Dept: OTOLARYNGOLOGY | Facility: CLINIC | Age: 68
End: 2023-03-20
Payer: MEDICARE

## 2023-03-20 VITALS
OXYGEN SATURATION: 99 % | HEART RATE: 75 BPM | BODY MASS INDEX: 17.66 KG/M2 | SYSTOLIC BLOOD PRESSURE: 135 MMHG | TEMPERATURE: 98 F | RESPIRATION RATE: 16 BRPM | HEIGHT: 62 IN | DIASTOLIC BLOOD PRESSURE: 87 MMHG | WEIGHT: 96 LBS

## 2023-03-20 DIAGNOSIS — H90.42 SENSORINEURAL HEARING LOSS, UNILATERAL, LEFT EAR, WITH UNRESTRICTED HEARING ON THE CONTRALATERAL SIDE: ICD-10-CM

## 2023-03-20 DIAGNOSIS — J06.9 ACUTE UPPER RESPIRATORY INFECTION, UNSPECIFIED: ICD-10-CM

## 2023-03-20 PROCEDURE — 99213 OFFICE O/P EST LOW 20 MIN: CPT

## 2023-03-20 PROCEDURE — 92567 TYMPANOMETRY: CPT

## 2023-03-20 PROCEDURE — 92557 COMPREHENSIVE HEARING TEST: CPT

## 2023-03-20 NOTE — HISTORY OF PRESENT ILLNESS
[de-identified] : 2 month followup visit for this 67 y/o F with l sudden hearing loss approximately 2 months ago. At that time she was treated with 4 IT dexamethasone injxns and po steroids. She is here to recheck her hearing. She feels tinnitus has improved. She denies any further episodes of dizziness. She is c/o uri for the past 4 days. She had recent dental work and was on augmentin and her other ENT prescribed her another week of augmentin which she just finished. She denies muscle aches.

## 2023-03-20 NOTE — PHYSICAL EXAM
[Midline] : trachea located in midline position [de-identified] : l tympanosclerosis  [de-identified] : engorged [de-identified] : engorged [Normal] : no nystagmus [de-identified] : gait steady

## 2023-03-20 NOTE — DATA REVIEWED
[de-identified] :  showed r nl hearing, l hf snhl- stable when compared to 23 -results reviewed with pt  [de-identified] : MRI 1/9/23 showed T2 signal abnormalities, r orbital mass- known meningoma, cervical arthritis -images and report rereviewed with pt

## 2023-03-20 NOTE — REASON FOR VISIT
[Subsequent Evaluation] : a subsequent evaluation for [FreeTextEntry2] : left sudden hearing loss and tinnitus

## 2023-03-20 NOTE — ASSESSMENT
[FreeTextEntry1] : 1. l sudden hearing loss presented as tinnitus\par -s/p po steroids and 4 IT dexamethasone injxns\par -MRI 23 showed T2 signal abnormalities, r orbital mass- known meningoma, cervical arthritis -images and report rereviewed with pt \par - showed r nl hearing, l hf snhl- stable when compared to 23 -results reviewed with pt \par -recommended hae- she prefers to hold off on this \par 2. recent uri per history\par -s/p 2 coursess of augmentin \par -recommended rest, fluids, hydration, isolation, motrin +/- tylenol \par -afrin BID for up to 3 days\par -if it persists followup with PCP to have lungs examined \par -she feels it is improving; offered resp swab - she preferred to hold off\par RTC in 6 months with rpt  or sooner as needed

## 2023-03-21 ENCOUNTER — APPOINTMENT (OUTPATIENT)
Dept: OPHTHALMOLOGY | Facility: CLINIC | Age: 68
End: 2023-03-21
Payer: MEDICARE

## 2023-03-21 ENCOUNTER — NON-APPOINTMENT (OUTPATIENT)
Age: 68
End: 2023-03-21

## 2023-03-21 PROCEDURE — 92134 CPTRZ OPH DX IMG PST SGM RTA: CPT

## 2023-03-21 PROCEDURE — 92014 COMPRE OPH EXAM EST PT 1/>: CPT

## 2023-06-30 ENCOUNTER — APPOINTMENT (OUTPATIENT)
Dept: OTOLARYNGOLOGY | Facility: CLINIC | Age: 68
End: 2023-06-30
Payer: MEDICARE

## 2023-06-30 VITALS
HEIGHT: 62 IN | SYSTOLIC BLOOD PRESSURE: 121 MMHG | DIASTOLIC BLOOD PRESSURE: 77 MMHG | WEIGHT: 97 LBS | TEMPERATURE: 97.3 F | BODY MASS INDEX: 17.85 KG/M2 | OXYGEN SATURATION: 97 % | HEART RATE: 76 BPM

## 2023-06-30 DIAGNOSIS — J31.1 CHRONIC NASOPHARYNGITIS: ICD-10-CM

## 2023-06-30 PROCEDURE — 99214 OFFICE O/P EST MOD 30 MIN: CPT | Mod: 25

## 2023-06-30 PROCEDURE — 31231 NASAL ENDOSCOPY DX: CPT

## 2023-06-30 PROCEDURE — 92567 TYMPANOMETRY: CPT

## 2023-06-30 RX ORDER — CIPROFLOXACIN HYDROCHLORIDE 500 MG/1
500 TABLET, FILM COATED ORAL
Qty: 20 | Refills: 0 | Status: ACTIVE | COMMUNITY
Start: 2023-06-30 | End: 1900-01-01

## 2023-06-30 NOTE — HISTORY OF PRESENT ILLNESS
[de-identified] : 3 month followup visit for this 67 y/o F presenting with b aural fullness and nasal congestion. She flew to and later back from [facility] 5/14/23 and has had trouble clearing her ears and nose is congested. It took days to clear ears and they still feel full. If she opens mandible widely she can clear her ears. She has been treated in the past for sudden hearing loss. She went to another otolaryngologist who prescribed a course of augmentin but ears still feel plugged. Nonsmoker.

## 2023-06-30 NOTE — DATA REVIEWED
[de-identified] : ett abnormal b reviewed with pt [de-identified] : mri from 1.2023 reviewed with pt - images and report -- r orbital meningioma sinuses and npx clear

## 2023-06-30 NOTE — REASON FOR VISIT
[Subsequent Evaluation] : a subsequent evaluation for [FreeTextEntry2] : b aural fullness and nasal congestion

## 2023-06-30 NOTE — PHYSICAL EXAM
[Nasal Endoscopy Performed] : nasal endoscopy was performed, see procedure section for findings [Normal] : no nystagmus [de-identified] : gait steady

## 2023-06-30 NOTE — ASSESSMENT
[FreeTextEntry1] : etd with purulence in npx appears to be coming from sinuses\par had augmentin\par cipro prescribed - warned about tendons\par has cataract - salione recommended\par rtc 2 weeks

## 2023-06-30 NOTE — PROCEDURE
[Posterior Lesion] : posterior lesion [Anterior rhinoscopy insufficient to account for symptoms] : anterior rhinoscopy insufficient to account for symptoms [Topical Lidocaine] : topical lidocaine [Oxymetazoline HCl] : oxymetazoline HCl [Flexible Endoscope] : examined with the flexible endoscope [Normal] : the paranasal sinuses had no abnormalities [Maxillary Sinus Right Drainage] : maxillary purulence on the right [Serial Number: ___] : Serial Number: [unfilled] [FreeTextEntry6] : done to check npx due to b etd to ro mass\par yellow purulent mucus coming from posterior sinuses over r et; l ok - photo taken and given to pt [FreeTextEntry3] : drainage

## 2023-07-17 ENCOUNTER — APPOINTMENT (OUTPATIENT)
Dept: OTOLARYNGOLOGY | Facility: CLINIC | Age: 68
End: 2023-07-17
Payer: MEDICARE

## 2023-07-17 VITALS
HEIGHT: 62 IN | OXYGEN SATURATION: 97 % | TEMPERATURE: 97.5 F | SYSTOLIC BLOOD PRESSURE: 116 MMHG | BODY MASS INDEX: 17.85 KG/M2 | WEIGHT: 97 LBS | DIASTOLIC BLOOD PRESSURE: 76 MMHG | HEART RATE: 79 BPM

## 2023-07-17 PROCEDURE — 99213 OFFICE O/P EST LOW 20 MIN: CPT

## 2023-07-17 PROCEDURE — 92557 COMPREHENSIVE HEARING TEST: CPT

## 2023-07-17 PROCEDURE — 92550 TYMPANOMETRY & REFLEX THRESH: CPT | Mod: 52

## 2023-07-17 NOTE — HISTORY OF PRESENT ILLNESS
[de-identified] : 17 day followup visit for this 67 y/o F with eustachian tube dysfunction. At last visit she was found to have sinusitis. She finished a course of augmentin prescribed by an outside ENT. She finished a course of cipro and uses nasal saline. She is c/o b ear pressure. She had drainage in NPX. Sinuses seem better. She has been treated in the past for a sudden hearing loss. She developed current sx on returning home from [place].

## 2023-07-17 NOTE — ASSESSMENT
[FreeTextEntry1] : sinus infx resolved\par no  negative mep but etd on ett\par can autoinsufflate\par recommend doing this 5x/d\par has glaucoma \par afrin before flight and ear planes on plane\par rtc 3 weeks to monitor progress

## 2023-07-17 NOTE — PHYSICAL EXAM
[Nasal Endoscopy Performed] : nasal endoscopy was performed, see procedure section for findings [Normal] : no nystagmus [de-identified] : b [de-identified] : gait steady

## 2023-07-17 NOTE — PROCEDURE
[Topical Lidocaine] : topical lidocaine [Oxymetazoline HCl] : oxymetazoline HCl [Flexible Endoscope] : examined with the flexible endoscope [Serial Number: ___] : Serial Number: [unfilled] [Normal] : the paranasal sinuses had no abnormalities [FreeTextEntry6] : done due to etd purulence seen from sinuses on last exam\par clear now

## 2023-07-17 NOTE — REASON FOR VISIT
[Subsequent Evaluation] : a subsequent evaluation for [FreeTextEntry2] : b aural fullness, nasal congestion

## 2023-08-10 ENCOUNTER — APPOINTMENT (OUTPATIENT)
Dept: OTOLARYNGOLOGY | Facility: CLINIC | Age: 68
End: 2023-08-10
Payer: MEDICARE

## 2023-08-10 DIAGNOSIS — H69.80 OTHER SPECIFIED DISORDERS OF EUSTACHIAN TUBE, UNSPECIFIED EAR: ICD-10-CM

## 2023-08-10 PROCEDURE — 99213 OFFICE O/P EST LOW 20 MIN: CPT

## 2023-08-10 PROCEDURE — 92567 TYMPANOMETRY: CPT

## 2023-08-10 NOTE — HISTORY OF PRESENT ILLNESS
[de-identified] : 3.5 week followup visit for this 69 y/o F with eustachian tube dysfunction. She was recently treated for a sinus infxn. At last visit she was found to have l etd. She has been insufflating and using saline. She feels she can clear her ears well with saline inhalation. She has upcoming flight next week. She has been treated in the past for a sudden hearing loss.

## 2023-08-10 NOTE — ASSESSMENT
[FreeTextEntry1] : etd  -pt feels she can clear both ears -continue nasal saline and sudafed PRN -for upcoming flight - recommended Afrin 30 minutes prior to take off, and ear planes  -if pt develops issues when she is away recommended she seek local care -discussed risks, benefits, and alternatives to b myringotomies - she prefers to hold off on this RTC when she is back from trip; call sooner for any issues

## 2023-11-10 ENCOUNTER — APPOINTMENT (OUTPATIENT)
Dept: OTOLARYNGOLOGY | Facility: CLINIC | Age: 68
End: 2023-11-10
Payer: MEDICARE

## 2023-11-10 VITALS
DIASTOLIC BLOOD PRESSURE: 87 MMHG | OXYGEN SATURATION: 99 % | TEMPERATURE: 98 F | SYSTOLIC BLOOD PRESSURE: 129 MMHG | BODY MASS INDEX: 17.85 KG/M2 | HEIGHT: 62 IN | HEART RATE: 70 BPM | WEIGHT: 97 LBS

## 2023-11-10 DIAGNOSIS — R09.81 NASAL CONGESTION: ICD-10-CM

## 2023-11-10 DIAGNOSIS — J30.1 ALLERGIC RHINITIS DUE TO POLLEN: ICD-10-CM

## 2023-11-10 PROCEDURE — 99213 OFFICE O/P EST LOW 20 MIN: CPT | Mod: 25

## 2023-11-10 PROCEDURE — 31231 NASAL ENDOSCOPY DX: CPT

## 2024-01-09 ENCOUNTER — APPOINTMENT (OUTPATIENT)
Dept: OPHTHALMOLOGY | Facility: CLINIC | Age: 69
End: 2024-01-09
Payer: MEDICARE

## 2024-01-09 ENCOUNTER — NON-APPOINTMENT (OUTPATIENT)
Age: 69
End: 2024-01-09

## 2024-01-09 PROCEDURE — 92136 OPHTHALMIC BIOMETRY: CPT

## 2024-01-09 PROCEDURE — 92025 CPTRIZED CORNEAL TOPOGRAPHY: CPT

## 2024-01-09 PROCEDURE — 92134 CPTRZ OPH DX IMG PST SGM RTA: CPT

## 2024-01-09 PROCEDURE — 92014 COMPRE OPH EXAM EST PT 1/>: CPT

## 2024-03-12 ENCOUNTER — APPOINTMENT (OUTPATIENT)
Dept: OPHTHALMOLOGY | Facility: CLINIC | Age: 69
End: 2024-03-12
Payer: MEDICARE

## 2024-03-12 ENCOUNTER — NON-APPOINTMENT (OUTPATIENT)
Age: 69
End: 2024-03-12

## 2024-03-12 PROCEDURE — 92012 INTRM OPH EXAM EST PATIENT: CPT

## 2024-05-06 RX ORDER — SODIUM CHLORIDE 9 MG/ML
1000 INJECTION, SOLUTION INTRAVENOUS
Refills: 0 | Status: DISCONTINUED | OUTPATIENT
Start: 2024-05-08 | End: 2024-05-08

## 2024-05-06 NOTE — ASU PATIENT PROFILE, ADULT - ANESTHESIA, PREVIOUS REACTION, PROFILE
----- Message from Trisha Boone sent at 3/28/2023  9:45 AM CDT -----  [email or web address]  Elizabeth Aguilar  1976  HOME: [phone]   WORK: N/A   CELL: [phone]       Patient is scheduled for Labs 23  Lab orders needed: NEED LAB ORDERS    Please insure lab orders will be available by the date of service.      
Orders Placed This Encounter   • CBC with Automated Differential   • Comprehensive Metabolic Panel   • Lipid Panel With Reflex   • Thyroid Stimulating Hormone Reflex   • Urinalysis With Microscopy & Culture If Indicated   • Microalbumin Urine Random   • Glycohemoglobin     
none

## 2024-05-06 NOTE — ASU PATIENT PROFILE, ADULT - NSICDXPASTSURGICALHX_GEN_ALL_CORE_FT
PAST SURGICAL HISTORY:  H/O blepharoplasty     H/O craniotomy orbital    History of bunionectomy of both great toes     History of uterine fibroid removal    S/P cataract extraction right

## 2024-05-06 NOTE — ASU PATIENT PROFILE, ADULT - BARIATRIC
Post-Anesthesia Evaluation and Assessment    Patient: Abhijit Shelton MRN: [id number]  SSN: [id number]    YOB: 1949  Age: 66 y.o. Sex: female      I have evaluated the patient and they are stable and ready for discharge from the PACU. Cardiovascular Function/Vital Signs  Visit Vitals  /68   Pulse (!) 57   Temp 37.1 °C (98.7 °F)   Resp 14   Ht 5' 2\" (1.575 m)   Wt 54.4 kg (120 lb)   SpO2 100%   Breastfeeding No   BMI 21.95 kg/m²       Patient is status post MAC anesthesia for Procedure(s):  COLONOSCOPY  COLON BIOPSY. Nausea/Vomiting: None    Postoperative hydration reviewed and adequate. Pain:  Pain Scale 1: Numeric (0 - 10) (02/19/21 1406)  Pain Intensity 1: 0 (02/19/21 1406)   Managed    Neurological Status: At baseline    Mental Status, Level of Consciousness: Alert and  oriented to person, place, and time    Pulmonary Status:   O2 Device: Room air (02/19/21 1406)   Adequate oxygenation and airway patent    Complications related to anesthesia: None    Post-anesthesia assessment completed. No concerns    Signed By: Roger Palmer MD     February 19, 2021              Procedure(s):  COLONOSCOPY  COLON BIOPSY. MAC    <BSHSIANPOST>    INITIAL Post-op Vital signs:   Vitals Value Taken Time   /68 02/19/21 1406   Temp 37.1 °C (98.7 °F) 02/19/21 1342   Pulse 62 02/19/21 1408   Resp 14 02/19/21 1408   SpO2 100 % 02/19/21 1408   Vitals shown include unvalidated device data. no

## 2024-05-06 NOTE — ASU PATIENT PROFILE, ADULT - FALL HARM RISK - UNIVERSAL INTERVENTIONS
Bed in lowest position, wheels locked, appropriate side rails in place/Call bell, personal items and telephone in reach/Instruct patient to call for assistance before getting out of bed or chair/Non-slip footwear when patient is out of bed/[place] to call system/Physically safe environment - no spills, clutter or unnecessary equipment/Purposeful Proactive Rounding/Room/bathroom lighting operational, light cord in reach

## 2024-05-06 NOTE — ASU PATIENT PROFILE, ADULT - NS PREOP UNDERSTANDS INFO
No solid/dairy/candy/gum after 11:30pm Tuesday, water allowed before 04:30am tomorrow, reminded to come with photo ID/insurance/credit card, arrange for escort, escort must have photo ID, no smoking/alcohol drinking/illicit drug use Tuesday; dress in comfortable clothes, no jewelries/contact lens; address and phone number was given./yes

## 2024-05-08 ENCOUNTER — NON-APPOINTMENT (OUTPATIENT)
Age: 69
End: 2024-05-08

## 2024-05-08 ENCOUNTER — OUTPATIENT (OUTPATIENT)
Dept: OUTPATIENT SERVICES | Facility: HOSPITAL | Age: 69
LOS: 1 days | Discharge: HOME | End: 2024-05-08

## 2024-05-08 ENCOUNTER — APPOINTMENT (OUTPATIENT)
Dept: OPHTHALMOLOGY | Facility: AMBULATORY SURGERY CENTER | Age: 69
End: 2024-05-08
Payer: MEDICARE

## 2024-05-08 ENCOUNTER — TRANSCRIPTION ENCOUNTER (OUTPATIENT)
Age: 69
End: 2024-05-08

## 2024-05-08 VITALS
SYSTOLIC BLOOD PRESSURE: 145 MMHG | DIASTOLIC BLOOD PRESSURE: 88 MMHG | HEART RATE: 61 BPM | OXYGEN SATURATION: 99 % | TEMPERATURE: 99 F | RESPIRATION RATE: 15 BRPM

## 2024-05-08 VITALS
WEIGHT: 95.68 LBS | TEMPERATURE: 98 F | HEART RATE: 65 BPM | HEIGHT: 62 IN | DIASTOLIC BLOOD PRESSURE: 76 MMHG | SYSTOLIC BLOOD PRESSURE: 149 MMHG | RESPIRATION RATE: 14 BRPM | OXYGEN SATURATION: 100 %

## 2024-05-08 DIAGNOSIS — Z86.018 PERSONAL HISTORY OF OTHER BENIGN NEOPLASM: Chronic | ICD-10-CM

## 2024-05-08 DIAGNOSIS — Z98.890 OTHER SPECIFIED POSTPROCEDURAL STATES: Chronic | ICD-10-CM

## 2024-05-08 DIAGNOSIS — Z98.49 CATARACT EXTRACTION STATUS, UNSPECIFIED EYE: Chronic | ICD-10-CM

## 2024-05-08 PROCEDURE — 66984 XCAPSL CTRC RMVL W/O ECP: CPT | Mod: LT

## 2024-05-08 DEVICE — LENS IOL TECNIS SMPLCTY 1PC CLR MONO DCB0000 21.5D
Type: IMPLANTABLE DEVICE | Site: LEFT | Status: NON-FUNCTIONAL
Removed: 2024-05-08

## 2024-05-08 RX ORDER — ACETAMINOPHEN 500 MG
650 TABLET ORAL ONCE
Refills: 0 | Status: DISCONTINUED | OUTPATIENT
Start: 2024-05-08 | End: 2024-05-08

## 2024-05-08 RX ORDER — OFLOXACIN 0.3 %
1 DROPS OPHTHALMIC (EYE)
Refills: 0 | Status: COMPLETED | OUTPATIENT
Start: 2024-05-08 | End: 2024-05-08

## 2024-05-08 RX ORDER — ONDANSETRON 8 MG/1
4 TABLET, FILM COATED ORAL ONCE
Refills: 0 | Status: DISCONTINUED | OUTPATIENT
Start: 2024-05-08 | End: 2024-05-08

## 2024-05-08 RX ORDER — OXYCODONE HYDROCHLORIDE 5 MG/1
5 TABLET ORAL ONCE
Refills: 0 | Status: DISCONTINUED | OUTPATIENT
Start: 2024-05-08 | End: 2024-05-08

## 2024-05-08 RX ORDER — PHENYLEPHRINE HCL 2.5 %
1 DROPS OPHTHALMIC (EYE)
Refills: 0 | Status: COMPLETED | OUTPATIENT
Start: 2024-05-08 | End: 2024-05-08

## 2024-05-08 RX ORDER — TROPICAMIDE 1 %
1 DROPS OPHTHALMIC (EYE)
Refills: 0 | Status: COMPLETED | OUTPATIENT
Start: 2024-05-08 | End: 2024-05-08

## 2024-05-08 RX ADMIN — Medication 1 DROP(S): at 06:31

## 2024-05-08 RX ADMIN — Medication 1 DROP(S): at 06:23

## 2024-05-08 RX ADMIN — Medication 1 DROP(S): at 06:38

## 2024-05-08 RX ADMIN — Medication 1 DROP(S): at 06:39

## 2024-05-08 NOTE — OPERATIVE REPORT - OPERATIVE RPOSRT DETAILS
DATE OF PROCEDURE: 05/08/24    SURGEON: INDRA CRUZ M.D.    ASSISTANT: Kiersten Morrell M.D.    PRE-OP DIAGNOSIS: Cataract, left eye    POST-OP DIAGNOSIS: Same    ANESTHESIA: MAC    PROCEDURE: Phaocemulsification with posterior chamber lens implant, left eye    SPECIMEN/TISSUE REMOVED: None    COMPLICATIONS: None    DESCRIPTION OF PROCEDURE:  The patient was brought to the operating room and the patient was prepped and draped in the usual sterile fashion, including Betadine drops in the eye.  A drop of topical anesthetic was placed in the eye.  A lid speculum was placed between the lids of the left eye.  A paracentesis was made inferiorly.  Intracameral preservative free lidocaine was instilled in the anterior chamber.  The anterior chamber was reformed with viscoelastic.  A clear cornea temporal incision was created using a 2.4mm metal bladed keratome.  A continuous curvilinear capsulorhexis was started with a cystotome and completed with capsulorhexis forceps.  The lens was hydrodissected with BSS.  The lens was then phacoemulsified using a phaco chop technique.  Residual cortical material was removed using automated irrigation and aspiration.  The capsular bag was reformed using a viscoelastic.  A DCB00 +21.5D lens was inserted into the bag.  Symmetric capsular bag fixation was confirmed.  The remaining viscoelastic was removed with automated irrigation and aspiration.  The wounds were hydrated and checked to be water tight.  The lid speculum was removed.  Ofloxacin was instilled in the eye.  The patient left the OR in stable condition having tolerated the procedure well.  IIndra was present throughout the case.

## 2024-05-09 ENCOUNTER — NON-APPOINTMENT (OUTPATIENT)
Age: 69
End: 2024-05-09

## 2024-05-09 ENCOUNTER — APPOINTMENT (OUTPATIENT)
Dept: OPHTHALMOLOGY | Facility: CLINIC | Age: 69
End: 2024-05-09
Payer: MEDICARE

## 2024-05-09 PROCEDURE — 99024 POSTOP FOLLOW-UP VISIT: CPT

## 2024-05-14 ENCOUNTER — APPOINTMENT (OUTPATIENT)
Dept: OPHTHALMOLOGY | Facility: CLINIC | Age: 69
End: 2024-05-14
Payer: MEDICARE

## 2024-05-14 ENCOUNTER — NON-APPOINTMENT (OUTPATIENT)
Age: 69
End: 2024-05-14

## 2024-05-14 PROCEDURE — 99024 POSTOP FOLLOW-UP VISIT: CPT

## 2024-05-20 ENCOUNTER — APPOINTMENT (OUTPATIENT)
Dept: OPHTHALMOLOGY | Facility: CLINIC | Age: 69
End: 2024-05-20

## 2024-05-28 ENCOUNTER — APPOINTMENT (OUTPATIENT)
Dept: MRI IMAGING | Facility: HOSPITAL | Age: 69
End: 2024-05-28

## 2024-05-28 ENCOUNTER — OUTPATIENT (OUTPATIENT)
Dept: OUTPATIENT SERVICES | Facility: HOSPITAL | Age: 69
LOS: 1 days | End: 2024-05-28
Payer: MEDICARE

## 2024-05-28 DIAGNOSIS — Z98.890 OTHER SPECIFIED POSTPROCEDURAL STATES: Chronic | ICD-10-CM

## 2024-05-28 DIAGNOSIS — Z86.018 PERSONAL HISTORY OF OTHER BENIGN NEOPLASM: Chronic | ICD-10-CM

## 2024-05-28 DIAGNOSIS — Z98.49 CATARACT EXTRACTION STATUS, UNSPECIFIED EYE: Chronic | ICD-10-CM

## 2024-05-28 PROCEDURE — A9585: CPT

## 2024-05-28 PROCEDURE — 72156 MRI NECK SPINE W/O & W/DYE: CPT | Mod: MH

## 2024-05-28 PROCEDURE — 72156 MRI NECK SPINE W/O & W/DYE: CPT | Mod: 26,MH

## 2024-06-11 ENCOUNTER — NON-APPOINTMENT (OUTPATIENT)
Age: 69
End: 2024-06-11

## 2024-06-11 ENCOUNTER — APPOINTMENT (OUTPATIENT)
Dept: OPHTHALMOLOGY | Facility: CLINIC | Age: 69
End: 2024-06-11
Payer: MEDICARE

## 2024-06-11 PROCEDURE — 99024 POSTOP FOLLOW-UP VISIT: CPT

## 2024-08-22 ENCOUNTER — APPOINTMENT (OUTPATIENT)
Dept: OTOLARYNGOLOGY | Facility: CLINIC | Age: 69
End: 2024-08-22
Payer: MEDICARE

## 2024-08-22 VITALS
BODY MASS INDEX: 17.85 KG/M2 | TEMPERATURE: 97.6 F | DIASTOLIC BLOOD PRESSURE: 87 MMHG | SYSTOLIC BLOOD PRESSURE: 137 MMHG | WEIGHT: 97 LBS | OXYGEN SATURATION: 99 % | HEART RATE: 73 BPM | HEIGHT: 62 IN

## 2024-08-22 VITALS — SYSTOLIC BLOOD PRESSURE: 123 MMHG | DIASTOLIC BLOOD PRESSURE: 78 MMHG | HEART RATE: 75 BPM

## 2024-08-22 DIAGNOSIS — H90.42 SENSORINEURAL HEARING LOSS, UNILATERAL, LEFT EAR, WITH UNRESTRICTED HEARING ON THE CONTRALATERAL SIDE: ICD-10-CM

## 2024-08-22 DIAGNOSIS — J30.1 ALLERGIC RHINITIS DUE TO POLLEN: ICD-10-CM

## 2024-08-22 DIAGNOSIS — H69.90 UNSPECIFIED EUSTACHIAN TUBE DISORDER, UNSPECIFIED EAR: ICD-10-CM

## 2024-08-22 PROCEDURE — 92550 TYMPANOMETRY & REFLEX THRESH: CPT | Mod: 52

## 2024-08-22 PROCEDURE — 31231 NASAL ENDOSCOPY DX: CPT

## 2024-08-22 PROCEDURE — 99213 OFFICE O/P EST LOW 20 MIN: CPT | Mod: 25

## 2024-08-22 PROCEDURE — 92557 COMPREHENSIVE HEARING TEST: CPT

## 2024-08-22 NOTE — PROCEDURE
[Posterior Lesion] : posterior lesion [Anterior rhinoscopy insufficient to account for symptoms] : anterior rhinoscopy insufficient to account for symptoms [Topical Lidocaine] : topical lidocaine [Oxymetazoline HCl] : oxymetazoline HCl [Flexible Endoscope] : examined with the flexible endoscope [Serial Number: ___] : Serial Number: [unfilled] [Allergic] : allergic signs [Normal] : the paranasal sinuses had no abnormalities [FreeTextEntry6] : done to visualize npx to ro mass lesion causing etd ar seen Mucosa: b allergic Mucus:b nl Polyps:b nl Inferior turbinate:b nl Middle turbinate:b nl Superior turbinate:b nl Inferior Meatus:b nl Middle Meatus:b nl Superior meatus:b nl Sphenoethmoidal recess:b nl

## 2024-08-22 NOTE — PHYSICAL EXAM
[Nasal Endoscopy Performed] : nasal endoscopy was performed, see procedure section for findings [Normal] : no neck adenopathy [de-identified] : ar [de-identified] : ar [de-identified] : gait steady

## 2024-08-22 NOTE — ASSESSMENT
[FreeTextEntry1] : 1) ar/etd - cannot use flonase (orbital mass) - saline, sudafed, afrin before flights and again after 10 hrs if still in air ear planes reassured no evidence of sinusitis rtc 6 om and as needed snhl reassured no change

## 2024-08-22 NOTE — HISTORY OF PRESENT ILLNESS
[de-identified] : 9 mo follow up appt for this 70 yo f with h/o r orbital meningioma. She has h/o ar and sinus trouble and is having difficulty clearing her ears. She is about to take some plane flights so she wished to have this evaluated. Denies facial pain or purulent drainage. -f.s.c

## 2025-06-16 ENCOUNTER — APPOINTMENT (OUTPATIENT)
Dept: OPHTHALMOLOGY | Facility: CLINIC | Age: 70
End: 2025-06-16
Payer: MEDICARE

## 2025-06-16 ENCOUNTER — NON-APPOINTMENT (OUTPATIENT)
Age: 70
End: 2025-06-16

## 2025-06-16 PROCEDURE — 92134 CPTRZ OPH DX IMG PST SGM RTA: CPT

## 2025-06-16 PROCEDURE — 92014 COMPRE OPH EXAM EST PT 1/>: CPT

## (undated) DEVICE — DRAPE MICROSCOPE KNOB COVER SMALL (2 PCS)

## (undated) DEVICE — CANNULA IRR ANT CHAMBER 30G

## (undated) DEVICE — SOL IRR BAG BSS 500ML

## (undated) DEVICE — PACK CENTURION 2.4MM

## (undated) DEVICE — GOWN SLEEVES

## (undated) DEVICE — KNIFE ALCON PARACENTESIS CLEARCUT SIDEPORT 1MM (YELLOW)

## (undated) DEVICE — RETRACTOR SYNERGETICS IRIS FLEXIBLE DISP

## (undated) DEVICE — CANNULA IRR AC CHAMBER 8MM BEND

## (undated) DEVICE — MARKER CIONNI TORIC REFERENCE

## (undated) DEVICE — GLV 7.5 PROTEXIS (WHITE)

## (undated) DEVICE — PACK ANTERIOR SEGMENT